# Patient Record
Sex: FEMALE | Race: WHITE | Employment: UNEMPLOYED | URBAN - METROPOLITAN AREA
[De-identification: names, ages, dates, MRNs, and addresses within clinical notes are randomized per-mention and may not be internally consistent; named-entity substitution may affect disease eponyms.]

---

## 2017-03-23 PROBLEM — O14.03 MILD PRE-ECLAMPSIA IN THIRD TRIMESTER: Status: ACTIVE | Noted: 2017-03-23

## 2019-10-01 ENCOUNTER — HOSPITAL ENCOUNTER (OUTPATIENT)
Dept: GENERAL RADIOLOGY | Age: 29
Discharge: HOME | End: 2019-10-01
Payer: COMMERCIAL

## 2019-10-01 ENCOUNTER — HOSPITAL ENCOUNTER (OUTPATIENT)
Age: 29
Discharge: HOME | End: 2019-10-01
Payer: COMMERCIAL

## 2019-10-01 DIAGNOSIS — M89.9 BONE/CARTILAGE DISORDER: ICD-10-CM

## 2019-10-01 DIAGNOSIS — M94.9 BONE/CARTILAGE DISORDER: ICD-10-CM

## 2019-10-01 DIAGNOSIS — M94.0 ACUTE COSTOCHONDRITIS: ICD-10-CM

## 2019-10-01 DIAGNOSIS — R10.13 ABDOMINAL PAIN, EPIGASTRIC: ICD-10-CM

## 2019-10-01 PROCEDURE — 74240 X-RAY XM UPR GI TRC 1CNTRST: CPT

## 2019-10-01 PROCEDURE — 71046 X-RAY EXAM CHEST 2 VIEWS: CPT

## 2021-01-11 ENCOUNTER — HOSPITAL ENCOUNTER (OUTPATIENT)
Age: 31
Setting detail: SPECIMEN
Discharge: HOME | End: 2021-01-11
Payer: COMMERCIAL

## 2021-01-11 ENCOUNTER — OFFICE VISIT (OUTPATIENT)
Dept: PRIMARY CARE CLINIC | Age: 31
End: 2021-01-11
Payer: COMMERCIAL

## 2021-01-11 VITALS — OXYGEN SATURATION: 99 % | TEMPERATURE: 97 F | RESPIRATION RATE: 16 BRPM | HEART RATE: 102 BPM

## 2021-01-11 DIAGNOSIS — R09.81 NASAL CONGESTION: ICD-10-CM

## 2021-01-11 DIAGNOSIS — R05.9 COUGH: Primary | ICD-10-CM

## 2021-01-11 DIAGNOSIS — R43.2 LOSS OF TASTE: ICD-10-CM

## 2021-01-11 DIAGNOSIS — R43.0 LOSS OF SMELL: ICD-10-CM

## 2021-01-11 PROCEDURE — G8421 BMI NOT CALCULATED: HCPCS | Performed by: NURSE PRACTITIONER

## 2021-01-11 PROCEDURE — 1036F TOBACCO NON-USER: CPT | Performed by: NURSE PRACTITIONER

## 2021-01-11 PROCEDURE — U0002 COVID-19 LAB TEST NON-CDC: HCPCS

## 2021-01-11 PROCEDURE — G8484 FLU IMMUNIZE NO ADMIN: HCPCS | Performed by: NURSE PRACTITIONER

## 2021-01-11 PROCEDURE — 99213 OFFICE O/P EST LOW 20 MIN: CPT | Performed by: NURSE PRACTITIONER

## 2021-01-11 PROCEDURE — G8427 DOCREV CUR MEDS BY ELIG CLIN: HCPCS | Performed by: NURSE PRACTITIONER

## 2021-01-11 NOTE — PROGRESS NOTES
1/11/21  Camden Chavira  1990    FLU/COVID-19 CLINIC EVALUATION    HPI SYMPTOMS:    Employer: [employer]    [] Fevers  [] Chills  [x] Cough  [] Coughing up blood  [] Chest Congestion  [x] Nasal Congestion  [] Feeling short of breath  [] Sometimes  [] Frequently  [] All the time  [] Chest pain  [] Headaches  []Tolerable  [] Severe  [] Sore throat  [] Muscle aches  [x] Nausea  [] Vomiting  []Unable to keep fluids down  [] Diarrhea  []Severe    [x] OTHER SYMPTOMS: LOSS OF TASTE, LOSS OF SMELL      Symptom Duration:   [] 1  [x] 2   [] 3   [] 4    [] 5   [] 6   [] 7   [] 8   [] 9   [] 10   [] 11   [] 12   [] 13   [] 14   [] Longer than 14 days    Symptom course:   [x] Worsening     [] Stable     [] Improving    RISK FACTORS:    [] Pregnant or possibly pregnant  [] Age over 61  [] Diabetes  [] Heart disease  [] Asthma  [] COPD/Other chronic lung diseases  [] Active Cancer  [] On Chemotherapy  [] Taking oral steroids  [] History Lymphoma/Leukemia  [] Close contact with a lab confirmed COVID-19 patient within 14 days of symptom onset  [] History of travel from affected geographical areas within 14 days of symptom onset       VITALS:  There were no vitals filed for this visit. TESTS:    POCT FLU:  [] Positive     []Negative    ASSESSMENT:    [] Flu  [] Possible COVID-19  [] Strep    PLAN:    [] Discharge home with written instructions for:  [] Flu management  [] Possible COVID-19 infection with self-quarantine and management of symptoms  [] Follow-up with primary care physician or emergency department if worsens  [] Evaluation per physician/NP/PA in clinic  [] Sent to ER       An  electronic signature was used to authenticate this note.      --Bhavin Driver MA on 1/11/2021 at 9:42 AM

## 2021-01-11 NOTE — PROGRESS NOTES
1/11/2021    HPI:  Chief complaint and history of present illness as per medical assistant/nurse documented today in the Flu/COVID-19 clinic. Mrs Dharmesh Cast presents to the clinic this morning for c/o loss of taste x 3 days, along with mild nasal congestion, PND, and non-productive cough for the same duration. Some body aches as well.  is also ill recently with similar symptoms. Otherwise, she has no known recent sick contacts. MEDICATIONS:  Prior to Visit Medications    Medication Sig Taking? Authorizing Provider   ibuprofen (ADVIL;MOTRIN) 100 MG/5ML suspension Take 40 mLs by mouth every 8 hours  Chris Bustamante MD   animal shapes plus iron (CHILDRENS CHEWABLE VITAMINS) 15 MG tablet Take 1 tablet by mouth daily. Historical Provider, MD       Allergies   Allergen Reactions    Promethazine-Codeine Other (See Comments)     disoriented    Amoxicillin Hives and Rash   ,   Past Medical History:   Diagnosis Date    Cataract     Right eye, no complications   ,   Past Surgical History:   Procedure Laterality Date    WISDOM TOOTH EXTRACTION      Done under general. No complications   ,   Social History     Tobacco Use    Smoking status: Never Smoker    Smokeless tobacco: Never Used   Substance Use Topics    Alcohol use: No    Drug use: No       PHYSICAL EXAM:  Physical Exam    ASSESSMENT/PLAN:  1. Cough-  general recommendations as far as conservative measurement/CDC guidelines discussed with patient today. Increase fluids, rest, and slowly advance diet. Quarantine measures discussed as per CDC guidelines. Pt to be called with results and encouraged to sign up for Mychart.   - COVID-19 Ambulatory    2. Loss of taste  - COVID-19 Ambulatory    3. Loss of smell  - COVID-19 Ambulatory    4. Nasal congestion  - COVID-19 Ambulatory     Your COVID 19 test can take 3-5 days for the results come back. We ask that you make a Mychart page and view your test results this way.       You will need to Self quarantine until you know your results. Increase fluids rest  Saline nasal spray as directed  Warm salt gargles for throat discomfort  Monitor temperature twice a day  Tylenol for fevers and/or discomfort. If symptoms are worse -Go to the ER. Follow up with your primary doctor    To Whom it May Concern:    Puneet French has been tested for COVID on 01/11/21. They may NOT return to work until their lab test results back and they been fever free for 3 days. If test is positive they must stay home for 2 weeks or until they test negative or as directed by the [facility]. FOLLOW-UP:  No follow-ups on file.     In addition to other information, the printed after visit summary provided to the patient includes:  [x] COVID-19 Self care instructions  [x] COVID-19 General patient information

## 2021-01-13 LAB
SARS-COV-2: DETECTED
SOURCE: ABNORMAL

## 2024-11-27 LAB — MAMMOGRAPHY, EXTERNAL: NORMAL

## 2025-07-18 ENCOUNTER — HOSPITAL ENCOUNTER (OUTPATIENT)
Age: 35
Setting detail: SPECIMEN
Discharge: HOME | End: 2025-07-18

## 2025-07-18 ENCOUNTER — OFFICE VISIT (OUTPATIENT)
Age: 35
End: 2025-07-18

## 2025-07-18 VITALS
SYSTOLIC BLOOD PRESSURE: 125 MMHG | DIASTOLIC BLOOD PRESSURE: 89 MMHG | HEIGHT: 65 IN | HEART RATE: 79 BPM | WEIGHT: 162 LBS | BODY MASS INDEX: 26.99 KG/M2

## 2025-07-18 DIAGNOSIS — Z01.419 ENCOUNTER FOR ANNUAL ROUTINE GYNECOLOGICAL EXAMINATION: ICD-10-CM

## 2025-07-18 DIAGNOSIS — D24.1 BREAST FIBROADENOMA IN FEMALE, RIGHT: Primary | ICD-10-CM

## 2025-07-18 SDOH — ECONOMIC STABILITY: FOOD INSECURITY: WITHIN THE PAST 12 MONTHS, YOU WORRIED THAT YOUR FOOD WOULD RUN OUT BEFORE YOU GOT MONEY TO BUY MORE.: NEVER TRUE

## 2025-07-18 SDOH — ECONOMIC STABILITY: FOOD INSECURITY: WITHIN THE PAST 12 MONTHS, THE FOOD YOU BOUGHT JUST DIDN'T LAST AND YOU DIDN'T HAVE MONEY TO GET MORE.: NEVER TRUE

## 2025-07-18 ASSESSMENT — PATIENT HEALTH QUESTIONNAIRE - PHQ9
SUM OF ALL RESPONSES TO PHQ QUESTIONS 1-9: 0
SUM OF ALL RESPONSES TO PHQ QUESTIONS 1-9: 0
1. LITTLE INTEREST OR PLEASURE IN DOING THINGS: NOT AT ALL
SUM OF ALL RESPONSES TO PHQ QUESTIONS 1-9: 0
2. FEELING DOWN, DEPRESSED OR HOPELESS: NOT AT ALL
SUM OF ALL RESPONSES TO PHQ QUESTIONS 1-9: 0

## 2025-07-18 NOTE — PROGRESS NOTES
Date: 2025   Name: Iva Snell  YOB: 1990    Chief Complaint   Patient presents with    Annual Exam     Annual exam. Non-smoker No known h/o dvt.  Patient's last menstrual period was 2025..Periods are regular.Patient is sexually active. Patient is not on birth control. Pap Smear was  3/31/22 and results were negativeHPV negative. Patient has no complaints          The patient is a 34 y.o. female,  who presents for her annual exam.  She is menstruating normally.  She is  sexually active. She is not currently taking birth control. S/P BS    She reports no gynecological symptoms.      Pap smear history: Her last PAP smear was in .  Her results were normal.    Past Medical History:   Diagnosis Date    Cataract     Right eye, no complications    Hematologic disorder     on B12     Past Surgical History:   Procedure Laterality Date    SALPINGECTOMY Bilateral 2024    L/S Bilateral salpingectomy with NESS at [facility]    TUBAL LIGATION  2024    WISDOM TOOTH EXTRACTION      Done under general. No complications     Family History   Problem Relation Age of Onset    Cervical Cancer Mother     Diabetes Maternal Grandmother     High Blood Pressure Maternal Grandmother     High Cholesterol Maternal Grandmother     Heart Disease Maternal Grandmother     Early Death Maternal Grandfather     Heart Disease Maternal Grandfather     High Blood Pressure Maternal Grandfather     Stroke Maternal Grandfather     Cancer Paternal Grandfather     Diabetes Paternal Grandfather     Early Death Paternal Grandfather     Miscarriages / Stillbirths Maternal Aunt      Social History     Tobacco Use    Smoking status: Never    Smokeless tobacco: Never   Substance Use Topics    Alcohol use: Yes     Comment: se Yearly and alcohol use: 2 drinks or less per day rare.    Drug use: No     Current Outpatient Medications   Medication Sig Dispense Refill    ibuprofen (ADVIL;MOTRIN) 100 MG/5ML suspension Take 40 mLs

## 2025-07-25 LAB — GYNECOLOGY CYTOLOGY REPORT: NORMAL
